# Patient Record
Sex: FEMALE | Race: WHITE
[De-identification: names, ages, dates, MRNs, and addresses within clinical notes are randomized per-mention and may not be internally consistent; named-entity substitution may affect disease eponyms.]

---

## 2017-07-05 ENCOUNTER — HOSPITAL ENCOUNTER (OUTPATIENT)
Dept: HOSPITAL 83 - RAD | Age: 67
Discharge: HOME | End: 2017-07-05
Attending: INTERNAL MEDICINE
Payer: MEDICARE

## 2017-07-05 DIAGNOSIS — N60.01: ICD-10-CM

## 2017-07-05 DIAGNOSIS — Z87.891: ICD-10-CM

## 2017-07-05 DIAGNOSIS — I10: ICD-10-CM

## 2017-07-05 DIAGNOSIS — J20.9: Primary | ICD-10-CM

## 2017-07-05 DIAGNOSIS — R09.89: ICD-10-CM

## 2017-07-10 ENCOUNTER — HOSPITAL ENCOUNTER (OUTPATIENT)
Dept: HOSPITAL 83 - LAB | Age: 67
Discharge: HOME | End: 2017-07-10
Payer: MEDICARE

## 2017-07-10 DIAGNOSIS — H25.89: ICD-10-CM

## 2017-07-10 DIAGNOSIS — I10: Primary | ICD-10-CM

## 2017-07-10 LAB
BASOPHILS # BLD AUTO: 0.1 10*3/UL (ref 0–0.1)
BASOPHILS NFR BLD AUTO: 0.5 % (ref 0–1)
CHLORIDE SERPL-SCNC: 105 MMOL/L (ref 98–107)
CO2 SERPL-SCNC: 29 MMOL/L (ref 21–32)
EOSINOPHIL # BLD AUTO: 0.1 10*3/UL (ref 0–0.4)
EOSINOPHIL # BLD AUTO: 1.4 % (ref 1–4)
ERYTHROCYTE [DISTWIDTH] IN BLOOD BY AUTOMATED COUNT: 14.6 % (ref 0–14.5)
HCT VFR BLD AUTO: 39.9 % (ref 37–47)
HGB BLD-MCNC: 12.6 G/DL (ref 12–16)
IG #: 0.2 10*3/UL (ref 0–0.1)
LYMPHOCYTES # BLD AUTO: 3.1 10*3/UL (ref 1.3–4.4)
LYMPHOCYTES NFR BLD AUTO: 30.5 % (ref 27–41)
MCH RBC QN AUTO: 27.9 PG (ref 27–31)
MCHC RBC AUTO-ENTMCNC: 31.6 G/DL (ref 33–37)
MCV RBC AUTO: 88.3 FL (ref 81–99)
MONOCYTES # BLD AUTO: 0.9 10*3/UL (ref 0.1–1)
MONOCYTES NFR BLD MANUAL: 9.2 % (ref 3–9)
NEUT #: 5.6 10*3/UL (ref 2.3–7.9)
NEUT %: 56.4 % (ref 47–73)
NRBC BLD QL AUTO: 0 % (ref 0–0)
PLATELET # BLD AUTO: 263 10*3/UL (ref 130–400)
PMV BLD AUTO: 9.4 FL (ref 9.6–12.3)
POTASSIUM SERPL-SCNC: 3.8 MMOL/L (ref 3.5–5.1)
RBC # BLD AUTO: 4.52 10*6/UL (ref 4.1–5.1)
SODIUM SERPL-SCNC: 144 MMOL/L (ref 136–145)
WBC NRBC COR # BLD AUTO: 10 10*3/UL (ref 4.8–10.8)

## 2018-07-26 ENCOUNTER — HOSPITAL ENCOUNTER (OUTPATIENT)
Dept: HOSPITAL 83 - LAB | Age: 68
Discharge: HOME | End: 2018-07-26
Attending: INTERNAL MEDICINE
Payer: MEDICARE

## 2018-07-26 DIAGNOSIS — R00.2: Primary | ICD-10-CM

## 2018-07-26 DIAGNOSIS — R73.02: ICD-10-CM

## 2018-07-26 DIAGNOSIS — E78.4: ICD-10-CM

## 2018-07-26 DIAGNOSIS — E55.9: ICD-10-CM

## 2018-07-26 LAB
ALBUMIN SERPL-MCNC: 3.9 GM/DL (ref 3.1–4.5)
ALP SERPL-CCNC: 119 U/L (ref 45–117)
ALT SERPL W P-5'-P-CCNC: 31 U/L (ref 12–78)
AST SERPL-CCNC: 23 IU/L (ref 3–35)
BUN SERPL-MCNC: 15 MG/DL (ref 7–24)
CHLORIDE SERPL-SCNC: 107 MMOL/L (ref 98–107)
CHOLEST SERPL-MCNC: 207 MG/DL (ref ?–200)
CREAT SERPL-MCNC: 0.75 MG/DL (ref 0.55–1.02)
HDLC SERPL-MCNC: 43 MG/DL (ref 40–60)
LDLC SERPL DIRECT ASSAY-MCNC: 101 MG/DL (ref 9–159)
POTASSIUM SERPL-SCNC: 4.1 MMOL/L (ref 3.5–5.1)
PROT SERPL-MCNC: 7.1 GM/DL (ref 6.4–8.2)
SODIUM SERPL-SCNC: 142 MMOL/L (ref 136–145)
TRIGL SERPL-MCNC: 316 MG/DL (ref ?–150)
VLDLC SERPL CALC-MCNC: 63 MG/DL (ref 6–40)

## 2020-08-11 ENCOUNTER — HOSPITAL ENCOUNTER (OUTPATIENT)
Dept: HOSPITAL 83 - COVID19 | Age: 70
Discharge: HOME | End: 2020-08-11
Attending: FAMILY MEDICINE
Payer: MEDICARE

## 2020-08-11 DIAGNOSIS — Z20.828: Primary | ICD-10-CM

## 2022-10-05 ENCOUNTER — HOSPITAL ENCOUNTER (EMERGENCY)
Dept: HOSPITAL 83 - ED | Age: 72
Discharge: HOME | End: 2022-10-05
Payer: MEDICARE

## 2022-10-05 VITALS — WEIGHT: 155 LBS | HEIGHT: 55 IN
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
## 2022-10-05 DIAGNOSIS — S42.102A: Primary | ICD-10-CM
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
## 2022-10-05 DIAGNOSIS — S20.212A: ICD-10-CM

## 2022-10-05 DIAGNOSIS — Z79.899: ICD-10-CM
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
## 2024-01-10 RX ORDER — VENLAFAXINE HYDROCHLORIDE 75 MG/1
75 CAPSULE, EXTENDED RELEASE ORAL DAILY
COMMUNITY

## 2024-01-10 RX ORDER — ASPIRIN 81 MG/1
81 TABLET ORAL
COMMUNITY

## 2024-01-10 RX ORDER — VENLAFAXINE HYDROCHLORIDE 150 MG/1
150 CAPSULE, EXTENDED RELEASE ORAL DAILY
COMMUNITY

## 2024-01-10 NOTE — PROGRESS NOTES
[facility] PRE-ADMISSION TESTING INSTRUCTIONS    The Preadmission Testing patient is instructed accordingly using the following criteria (check applicable):    ARRIVAL INSTRUCTIONS:  [x] Parking the day of Surgery is located in the Main Entrance lot.  Upon entering the door, make an immediate right to the surgery reception desk    [x] Bring photo ID and insurance card    [] Bring in a copy of Living will or Durable Power of  papers.    [x] Please be sure to arrange for responsible adult to provide transportation to and from the hospital    [x] Please arrange for responsible adult to be with you for the 24 hour period post procedure due to having anesthesia    [x] If you awake am of surgery not feeling well or have temperature >100 please call [phone]    GENERAL INSTRUCTIONS:    [x] Nothing by mouth after midnight, including gum, candy, mints or water    [x] You may brush your teeth, but do not swallow any water    [x] Take medications as instructed with 1-2 oz of water    [] Stop herbal supplements and vitamins 5 days prior to procedure    [] Follow preop dosing of blood thinners per physician instructions    [] Take 1/2 dose of evening insulin, but no insulin after midnight    [] No oral diabetic medications after midnight    [] If diabetic and have low blood sugar or feel symptomatic, take 1-2oz apple juice only    [] Bring inhalers day of surgery    [] Bring C-PAP/ Bi-Pap day of surgery    [] Bring urine specimen day of surgery    [x] Shower or bath with soap, lather and rinse well, AM of Surgery, no lotion, powders or creams to surgical site    [] Follow bowel prep as instructed per surgeon    [x] No tobacco products within 24 hours of surgery     [x] No alcohol or illegal drug use within 24 hours of surgery.    [x] Jewelry, body piercing's, eyeglasses, contact lenses and dentures are not permitted into surgery (bring cases)      [x] Please do not wear any nail polish,

## 2024-01-14 ENCOUNTER — ANESTHESIA EVENT (OUTPATIENT)
Dept: OPERATING ROOM | Age: 74
End: 2024-01-14
Payer: MEDICARE

## 2024-01-16 ENCOUNTER — ANESTHESIA (OUTPATIENT)
Dept: OPERATING ROOM | Age: 74
End: 2024-01-16
Payer: MEDICARE

## 2024-01-16 ENCOUNTER — HOSPITAL ENCOUNTER (OUTPATIENT)
Age: 74
Setting detail: OUTPATIENT SURGERY
Discharge: HOME | End: 2024-01-16
Attending: OPHTHALMOLOGY | Admitting: OPHTHALMOLOGY
Payer: MEDICARE

## 2024-01-16 VITALS
HEIGHT: 63 IN | OXYGEN SATURATION: 95 % | RESPIRATION RATE: 16 BRPM | SYSTOLIC BLOOD PRESSURE: 140 MMHG | TEMPERATURE: 96.8 F | WEIGHT: 154 LBS | BODY MASS INDEX: 27.29 KG/M2 | DIASTOLIC BLOOD PRESSURE: 84 MMHG | HEART RATE: 81 BPM

## 2024-01-16 PROCEDURE — 6360000002 HC RX W HCPCS: Performed by: NURSE ANESTHETIST, CERTIFIED REGISTERED

## 2024-01-16 PROCEDURE — 6370000000 HC RX 637 (ALT 250 FOR IP): Performed by: OPHTHALMOLOGY

## 2024-01-16 PROCEDURE — 3700000001 HC ADD 15 MINUTES (ANESTHESIA): Performed by: OPHTHALMOLOGY

## 2024-01-16 PROCEDURE — 2500000003 HC RX 250 WO HCPCS: Performed by: OPHTHALMOLOGY

## 2024-01-16 PROCEDURE — 6360000002 HC RX W HCPCS: Performed by: OPHTHALMOLOGY

## 2024-01-16 PROCEDURE — 3600000003 HC SURGERY LEVEL 3 BASE: Performed by: OPHTHALMOLOGY

## 2024-01-16 PROCEDURE — 3600000013 HC SURGERY LEVEL 3 ADDTL 15MIN: Performed by: OPHTHALMOLOGY

## 2024-01-16 PROCEDURE — 2709999900 HC NON-CHARGEABLE SUPPLY: Performed by: OPHTHALMOLOGY

## 2024-01-16 PROCEDURE — 2580000003 HC RX 258: Performed by: NURSE ANESTHETIST, CERTIFIED REGISTERED

## 2024-01-16 PROCEDURE — 7100000011 HC PHASE II RECOVERY - ADDTL 15 MIN: Performed by: OPHTHALMOLOGY

## 2024-01-16 PROCEDURE — 3700000000 HC ANESTHESIA ATTENDED CARE: Performed by: OPHTHALMOLOGY

## 2024-01-16 PROCEDURE — 7100000010 HC PHASE II RECOVERY - FIRST 15 MIN: Performed by: OPHTHALMOLOGY

## 2024-01-16 RX ORDER — MIDAZOLAM HYDROCHLORIDE 1 MG/ML
INJECTION INTRAMUSCULAR; INTRAVENOUS PRN
Status: DISCONTINUED | OUTPATIENT
Start: 2024-01-16 | End: 2024-01-16 | Stop reason: SDUPTHER

## 2024-01-16 RX ORDER — CYCLOPENTOLATE HYDROCHLORIDE 10 MG/ML
1 SOLUTION/ DROPS OPHTHALMIC EVERY 10 MIN PRN
Status: DISCONTINUED | OUTPATIENT
Start: 2024-01-16 | End: 2024-01-16 | Stop reason: HOSPADM

## 2024-01-16 RX ORDER — SODIUM CHLORIDE 0.9 % (FLUSH) 0.9 %
5-40 SYRINGE (ML) INJECTION PRN
Status: CANCELLED | OUTPATIENT
Start: 2024-01-16

## 2024-01-16 RX ORDER — SODIUM CHLORIDE 9 MG/ML
INJECTION, SOLUTION INTRAVENOUS PRN
Status: CANCELLED | OUTPATIENT
Start: 2024-01-16

## 2024-01-16 RX ORDER — SODIUM CHLORIDE 0.9 % (FLUSH) 0.9 %
5-40 SYRINGE (ML) INJECTION EVERY 12 HOURS SCHEDULED
Status: CANCELLED | OUTPATIENT
Start: 2024-01-16

## 2024-01-16 RX ORDER — PROPARACAINE HYDROCHLORIDE 5 MG/ML
1 SOLUTION/ DROPS OPHTHALMIC
Status: DISCONTINUED | OUTPATIENT
Start: 2024-01-16 | End: 2024-01-16 | Stop reason: HOSPADM

## 2024-01-16 RX ORDER — FENTANYL CITRATE 50 UG/ML
INJECTION, SOLUTION INTRAMUSCULAR; INTRAVENOUS PRN
Status: DISCONTINUED | OUTPATIENT
Start: 2024-01-16 | End: 2024-01-16 | Stop reason: SDUPTHER

## 2024-01-16 RX ORDER — DEXAMETHASONE SODIUM PHOSPHATE 10 MG/ML
INJECTION INTRAMUSCULAR; INTRAVENOUS PRN
Status: DISCONTINUED | OUTPATIENT
Start: 2024-01-16 | End: 2024-01-16 | Stop reason: ALTCHOICE

## 2024-01-16 RX ORDER — SODIUM CHLORIDE 0.9 % (FLUSH) 0.9 %
5-40 SYRINGE (ML) INJECTION EVERY 12 HOURS SCHEDULED
Status: DISCONTINUED | OUTPATIENT
Start: 2024-01-16 | End: 2024-01-16 | Stop reason: HOSPADM

## 2024-01-16 RX ORDER — TROPICAMIDE 10 MG/ML
1 SOLUTION/ DROPS OPHTHALMIC EVERY 10 MIN PRN
Status: COMPLETED | OUTPATIENT
Start: 2024-01-16 | End: 2024-01-16

## 2024-01-16 RX ORDER — SODIUM CHLORIDE 9 MG/ML
INJECTION, SOLUTION INTRAVENOUS CONTINUOUS PRN
Status: DISCONTINUED | OUTPATIENT
Start: 2024-01-16 | End: 2024-01-16 | Stop reason: SDUPTHER

## 2024-01-16 RX ORDER — TETRACAINE HYDROCHLORIDE 5 MG/ML
SOLUTION OPHTHALMIC
Status: DISCONTINUED
Start: 2024-01-16 | End: 2024-01-16 | Stop reason: HOSPADM

## 2024-01-16 RX ORDER — PHENYLEPHRINE HYDROCHLORIDE 25 MG/ML
1 SOLUTION/ DROPS OPHTHALMIC EVERY 10 MIN PRN
Status: COMPLETED | OUTPATIENT
Start: 2024-01-16 | End: 2024-01-16

## 2024-01-16 RX ORDER — TRIAMCINOLONE ACETONIDE 40 MG/ML
40 INJECTION, SUSPENSION INTRA-ARTICULAR; INTRAMUSCULAR ONCE
Status: COMPLETED | OUTPATIENT
Start: 2024-01-16 | End: 2024-01-16

## 2024-01-16 RX ORDER — SODIUM CHLORIDE 0.9 % (FLUSH) 0.9 %
5-40 SYRINGE (ML) INJECTION PRN
Status: DISCONTINUED | OUTPATIENT
Start: 2024-01-16 | End: 2024-01-16 | Stop reason: HOSPADM

## 2024-01-16 RX ORDER — PHENYLEPHRINE HYDROCHLORIDE 100 MG/ML
1 SOLUTION/ DROPS OPHTHALMIC
Status: COMPLETED | OUTPATIENT
Start: 2024-01-16 | End: 2024-01-16

## 2024-01-16 RX ORDER — TRAMADOL HYDROCHLORIDE 50 MG/1
50 TABLET ORAL PRN
Status: CANCELLED | OUTPATIENT
Start: 2024-01-16 | End: 2024-01-16

## 2024-01-16 RX ORDER — TRAMADOL HYDROCHLORIDE 50 MG/1
100 TABLET ORAL PRN
Status: CANCELLED | OUTPATIENT
Start: 2024-01-16 | End: 2024-01-16

## 2024-01-16 RX ORDER — SODIUM CHLORIDE 9 MG/ML
INJECTION, SOLUTION INTRAVENOUS PRN
Status: DISCONTINUED | OUTPATIENT
Start: 2024-01-16 | End: 2024-01-16 | Stop reason: HOSPADM

## 2024-01-16 RX ADMIN — Medication 1 DROP: at 06:11

## 2024-01-16 RX ADMIN — Medication 1 DROP: at 06:06

## 2024-01-16 RX ADMIN — PHENYLEPHRINE HYDROCHLORIDE 1 DROP: 25 SOLUTION/ DROPS OPHTHALMIC at 06:13

## 2024-01-16 RX ADMIN — FENTANYL CITRATE 50 MCG: 50 INJECTION, SOLUTION INTRAMUSCULAR; INTRAVENOUS at 07:16

## 2024-01-16 RX ADMIN — MIDAZOLAM 2 MG: 1 INJECTION INTRAMUSCULAR; INTRAVENOUS at 07:01

## 2024-01-16 RX ADMIN — SODIUM CHLORIDE: 9 INJECTION, SOLUTION INTRAVENOUS at 06:54

## 2024-01-16 RX ADMIN — PHENYLEPHRINE HYDROCHLORIDE 1 DROP: 25 SOLUTION/ DROPS OPHTHALMIC at 06:22

## 2024-01-16 RX ADMIN — Medication 1 DROP: at 06:22

## 2024-01-16 RX ADMIN — PHENYLEPHRINE HYDROCHLORIDE 1 DROP: 100 SOLUTION/ DROPS OPHTHALMIC at 06:06

## 2024-01-16 RX ADMIN — PHENYLEPHRINE HYDROCHLORIDE 1 DROP: 25 SOLUTION/ DROPS OPHTHALMIC at 06:09

## 2024-01-16 ASSESSMENT — PAIN - FUNCTIONAL ASSESSMENT
PAIN_FUNCTIONAL_ASSESSMENT: 0-10
PAIN_FUNCTIONAL_ASSESSMENT: 0-10

## 2024-01-16 ASSESSMENT — LIFESTYLE VARIABLES: SMOKING_STATUS: 0

## 2024-01-16 NOTE — ANESTHESIA POSTPROCEDURE EVALUATION
Department of Anesthesiology  Postprocedure Note    Patient: Pat Dueñas  MRN: [id number]  YOB: 1950  Date of evaluation: 1/16/2024    Procedure Summary       Date: 01/16/24 Room / Location: [address]    Anesthesia Start: 0654 Anesthesia Stop: 0751    Procedure: PARS PLANA VITRECTOMY 25 GAUGE VITREOMACULAR TRACTION RELEASE WITH GAS FLUID EXCHANGE RIGHT EYE (Right: Eye) Diagnosis:       Vitreomacular adhesion of right eye      (Vitreomacular adhesion of right eye [H43.821])    Surgeons: Lan Goldman MD Responsible Provider: Fercho Buck MD    Anesthesia Type: regional, MAC ASA Status: 2            Anesthesia Type: No value filed.    Horacio Phase I: Horacio Score: 10    Horacio Phase II:      Anesthesia Post Evaluation    Patient location during evaluation: PACU  Patient participation: complete - patient participated  Level of consciousness: awake and alert  Airway patency: patent  Nausea & Vomiting: no nausea and no vomiting  Cardiovascular status: hemodynamically stable  Respiratory status: acceptable  Pain management: adequate        No notable events documented.

## 2024-01-16 NOTE — ANESTHESIA PRE PROCEDURE
Department of Anesthesiology  Preprocedure Note       Name:  Pat Dueñas   Age:  73 y.o.  :  1950                                          MRN:  [id number]         Date:  2024      Surgeon: Surgeon(s):  Lan Goldman MD    Procedure: Procedure(s):  PARS PLANA VITRECTOMY 25 GAUGE VITREOMACULAR TRACTION RELEASE POSSIBLE GAS FLUID EXCHANGE RIGHT EYE    Medications prior to admission:   Prior to Admission medications    Medication Sig Start Date End Date Taking? Authorizing Provider   venlafaxine (EFFEXOR XR) 150 MG extended release capsule Take 1 capsule by mouth daily   Yes Samuel Cook MD   venlafaxine (EFFEXOR XR) 75 MG extended release capsule Take 1 capsule by mouth daily   Yes Samuel Cook MD   aspirin 81 MG EC tablet Take 1 tablet by mouth three times a week   Yes Samuel Cook MD   atorvastatin (LIPITOR) 80 MG tablet Take 80 mg by mouth daily.      Samuel Cook MD   sertraline (ZOLOFT) 100 MG tablet Take 100 mg by mouth 2 times daily.      ProviderSamuel MD       Current medications:    No current facility-administered medications for this encounter.       Allergies:  No Known Allergies    Problem List:    Patient Active Problem List   Diagnosis Code   • Syncope R55   • Palpitations R00.2       Past Medical History:        Diagnosis Date   • Cancer (HCC)     right breast   • Depression    • Floater, vitreous, right    • Hyperlipidemia    • Hypertension     not on any medications   • Syncope 2013       Past Surgical History:        Procedure Laterality Date   • COLONOSCOPY     • ESOPHAGOGASTRODUODENOSCOPY     • HYSTERECTOMY, TOTAL ABDOMINAL (CERVIX REMOVED)  1993   • TONSILLECTOMY         Social History:    Social History     Tobacco Use   • Smoking status: Former     Current packs/day: 0.00     Average packs/day: 0.5 packs/day for 20.0 years (10.0 ttl pk-yrs)     Types: Cigarettes     Start date: 1972     Quit date: 1992

## 2024-01-17 NOTE — OP [place] [id number]                                OPERATIVE REPORT    PATIENT NAME: VIJAYA ANDERS                  :        1950  MED REC NO:   [id number]                            ROOM:  ACCOUNT NO:   [id number]                           ADMIT DATE: 2024  PROVIDER:     Lan Goldman MD    DATE OF PROCEDURE:  2024    SURGEON:  Lan Goldman MD.    ASSISTANT:  Jane Parnell.    PREOPERATIVE DIAGNOSIS:  Vitreomacular traction of the right eye.    POSTOPERATIVE DIAGNOSIS:  Vitreomacular traction of the right eye.    OPERATION PERFORMED:  A 25 gauge vitrectomy with posterior hyaloid  separation, posterior capsular opening, and air-fluid exchange.    COMPLICATIONS:  None.    ANESTHESIA:  Local with retrobulbar, right orbit.    CLINICAL NOTE:  The patient is a 73-year-old female who developed  symptomatic vitreomacular traction in the right eye.  Risks and benefits  of vitrectomy surgery were discussed with the patient in layman's terms.  After understanding all the risks and benefits, she agreed to undergo  the procedure.    DESCRIPTION OF PROCEDURE:  After proper informed consent was obtained,  the patient was brought to the operating room and placed in the supine  position where some sedation was given. Following this, a retrobulbar  block was administered in the right retrobulbar space.  Approximately 7  mL of a 50:50 mixture of 0.5% Marcaine with 2% lidocaine was  administered.  After adequate anesthesia and akinesia, the patient was  then prepped and draped in the usual fashion for an ophthalmic surgery  of her right eye.  A All wire lid speculum was inserted into the  conjunctival fornices.  Trocars were introduced infratemporally,  superotemporally, and superonasally each 3.5 mm posterior to the limbus  with gentle conjunctival displacement.  An infusion cannula was opened  under direct

## 2024-10-08 ENCOUNTER — HOSPITAL ENCOUNTER (EMERGENCY)
Dept: HOSPITAL 83 - ED | Age: 74
Discharge: HOME | End: 2024-10-08
Payer: MEDICARE

## 2024-10-08 VITALS — BODY MASS INDEX: 27.49 KG/M2 | HEIGHT: 63.98 IN | WEIGHT: 161 LBS

## 2024-10-08 DIAGNOSIS — Z98.890: ICD-10-CM

## 2024-10-08 DIAGNOSIS — I10: ICD-10-CM

## 2024-10-08 DIAGNOSIS — K21.9: ICD-10-CM

## 2024-10-08 DIAGNOSIS — F32.A: ICD-10-CM

## 2024-10-08 DIAGNOSIS — R55: Primary | ICD-10-CM

## 2024-10-08 DIAGNOSIS — E78.00: ICD-10-CM

## 2024-10-08 DIAGNOSIS — Z90.710: ICD-10-CM

## 2024-10-08 LAB
ALP SERPL-CCNC: 83 U/L (ref 46–116)
ALT SERPL W P-5'-P-CCNC: 9 U/L (ref 5–49)
BASOPHILS # BLD AUTO: 0.1 10*3/UL (ref 0–0.1)
BASOPHILS NFR BLD AUTO: 1 % (ref 0–1)
BUN SERPL-MCNC: 11 MG/DL (ref 9–23)
CHLORIDE SERPL-SCNC: 105 MMOL/L (ref 98–107)
EOSINOPHIL # BLD AUTO: 0.1 10*3/UL (ref 0–0.4)
EOSINOPHIL # BLD AUTO: 1.6 % (ref 1–4)
ERYTHROCYTE [DISTWIDTH] IN BLOOD BY AUTOMATED COUNT: 14.5 % (ref 0–14.5)
HCT VFR BLD AUTO: 35.2 % (ref 37–47)
LYMPHOCYTES # BLD AUTO: 1.5 10*3/UL (ref 1.3–4.4)
LYMPHOCYTES NFR BLD AUTO: 21.8 % (ref 27–41)
MCH RBC QN AUTO: 25.2 PG (ref 27–31)
MCHC RBC AUTO-ENTMCNC: 30.7 G/DL (ref 33–37)
MCV RBC AUTO: 82.1 FL (ref 81–99)
MONOCYTES # BLD AUTO: 0.6 10*3/UL (ref 0.1–1)
MONOCYTES NFR BLD MANUAL: 7.8 % (ref 3–9)
NEUT #: 4.6 10*3/UL (ref 2.3–7.9)
NEUT %: 65.8 % (ref 47–73)
NRBC BLD QL AUTO: 0 % (ref 0–0)
PLATELET # BLD AUTO: 346 10*3/UL (ref 130–400)
PMV BLD AUTO: 8.3 FL (ref 9.6–12.3)
POTASSIUM SERPL-SCNC: 3.5 MMOL/L (ref 3.4–5.1)
PROT SERPL-MCNC: 6.6 GM/DL (ref 6–8)
RBC # BLD AUTO: 4.29 10*6/UL (ref 4.1–5.1)
WBC NRBC COR # BLD AUTO: 7 10*3/UL (ref 4.8–10.8)

## (undated) DEVICE — GLOVE SURG SZ 7 CRM LTX FREE POLYISOPRENE POLYMER BEAD ANTI

## (undated) DEVICE — STERILE POLYISOPRENE POWDER-FREE SURGICAL GLOVES: Brand: PROTEXIS

## (undated) DEVICE — SHIELD EYE W3XL2.5IN UNIV CLR PLAS LTWT

## (undated) DEVICE — PACK SURGICAL PROCEDURE 25 GAUGE/20PK

## (undated) DEVICE — SOLUTION IRRIG 1000ML 0.9% SOD CHL USP POUR PLAS BTL

## (undated) DEVICE — NEEDLE HYPO 25GA L1.5IN BLU POLYPR HUB S STL REG BVL STR

## (undated) DEVICE — GLOVE ORANGE PI 7 1/2   MSG9075

## (undated) DEVICE — TAPE ADH W2INXL10YD PLAS TRNSPAR H2O RESIST HYPOALRG CURAD

## (undated) DEVICE — Device

## (undated) DEVICE — 3M™ TEGADERM™ TRANSPARENT FILM DRESSING FRAME STYLE, 1626W, 4 IN X 4-3/4 IN (10 CM X 12 CM), 50/CT 4CT/CASE: Brand: 3M™ TEGADERM™

## (undated) DEVICE — SOLUTION IRRIGATION BAL SALT SOLUTION 15 ML STRL BSS

## (undated) DEVICE — DRAPE MICSCP FULL FLD STRL OCULUS ZEISS

## (undated) DEVICE — TAPE ADH W1INXL10YD WHT PAPR GENTLE BRTH FLX COMFORTABLE

## (undated) DEVICE — BRACELET ID ALERT FOR PT INFO ISPAN

## (undated) DEVICE — LENS VITRCTMY OCU FLAT DISP

## (undated) DEVICE — PAD,EYE,1-5/8X2 5/8,STERILE,LF,1/PK: Brand: MEDLINE

## (undated) DEVICE — GLOVE ORANGE PI 7   MSG9070

## (undated) DEVICE — PACK PROCEDURE SURG RETINAL ST ES CUST

## (undated) DEVICE — CANNULA INJ 25GA SFT TIP DISP

## (undated) DEVICE — SYRINGE, LUER LOCK, 10ML: Brand: MEDLINE

## (undated) DEVICE — NEEDLE FLTR 18GA L1.5IN MEM THK5UM BLNT DISP

## (undated) DEVICE — 1 ML TUBERCULIN SYRINGE,DETACHABLE NEEDLE: Brand: MONOJECT

## (undated) DEVICE — BIOM OPTIC SET DISPOSABLE, WITH BIOM HD FLEX LENS FOR F=175 MM OR F=200 MM: Brand: BIOM OPTIC SET DISPOSABLE, WITH BIOM HD FLEX LENS FOR F=175 MM OR F=200 MM